# Patient Record
Sex: FEMALE | Race: WHITE | NOT HISPANIC OR LATINO | Employment: OTHER | URBAN - METROPOLITAN AREA
[De-identification: names, ages, dates, MRNs, and addresses within clinical notes are randomized per-mention and may not be internally consistent; named-entity substitution may affect disease eponyms.]

---

## 2021-10-14 ENCOUNTER — TELEPHONE (OUTPATIENT)
Dept: RHEUMATOLOGY | Facility: CLINIC | Age: 76
End: 2021-10-14

## 2021-10-15 ENCOUNTER — TELEPHONE (OUTPATIENT)
Dept: RHEUMATOLOGY | Facility: CLINIC | Age: 76
End: 2021-10-15

## 2021-10-24 PROBLEM — R53.1 WEAKNESS: Status: RESOLVED | Noted: 2021-10-24 | Resolved: 2021-10-24

## 2021-10-24 PROBLEM — C67.9: Status: ACTIVE | Noted: 2021-10-24

## 2021-10-24 PROBLEM — Z71.89 ACP (ADVANCE CARE PLANNING): Status: ACTIVE | Noted: 2021-10-24

## 2021-10-24 PROBLEM — E03.9 HYPOTHYROIDISM: Status: ACTIVE | Noted: 2021-10-24

## 2021-10-24 PROBLEM — I24.89 DEMAND ISCHEMIA: Status: ACTIVE | Noted: 2021-10-24

## 2021-10-24 PROBLEM — R53.1 WEAKNESS: Status: ACTIVE | Noted: 2021-10-24

## 2021-10-24 PROBLEM — N13.30: Status: ACTIVE | Noted: 2021-10-24

## 2021-10-24 PROBLEM — R73.9 HYPERGLYCEMIA: Status: ACTIVE | Noted: 2021-10-24

## 2021-10-24 PROBLEM — N94.89 ADNEXAL MASS: Status: ACTIVE | Noted: 2021-10-24

## 2021-10-25 PROBLEM — N13.39 OTHER HYDRONEPHROSIS: Status: ACTIVE | Noted: 2021-10-25

## 2022-03-10 PROBLEM — N13.30: Status: RESOLVED | Noted: 2021-10-24 | Resolved: 2022-03-10

## 2022-03-10 PROBLEM — C67.9: Status: RESOLVED | Noted: 2021-10-24 | Resolved: 2022-03-10

## 2022-06-27 ENCOUNTER — TELEPHONE (OUTPATIENT)
Dept: RHEUMATOLOGY | Facility: CLINIC | Age: 77
End: 2022-06-27
Payer: MEDICARE

## 2023-03-24 PROBLEM — I63.9 ACUTE CVA (CEREBROVASCULAR ACCIDENT): Status: ACTIVE | Noted: 2023-03-24

## 2023-03-24 PROBLEM — D72.829 LEUKOCYTOSIS: Status: ACTIVE | Noted: 2023-03-24

## 2023-03-24 PROBLEM — C56.9 OVARIAN CANCER: Status: ACTIVE | Noted: 2023-03-24

## 2023-03-25 PROBLEM — E78.49 OTHER HYPERLIPIDEMIA: Status: ACTIVE | Noted: 2023-03-25

## 2023-03-25 PROBLEM — I63.89 ACUTE ARTERIAL ISCHEMIC STROKE, MULTIFOCAL, MULTIPLE VASCULAR TERRITORIES: Status: ACTIVE | Noted: 2023-03-24

## 2023-03-25 PROBLEM — C54.1 ENDOMETRIAL ADENOCARCINOMA: Status: ACTIVE | Noted: 2023-03-25

## 2023-03-25 PROBLEM — N30.90 CYSTITIS: Status: ACTIVE | Noted: 2023-03-25

## 2023-03-25 PROBLEM — E87.1 HYPONATREMIA: Status: ACTIVE | Noted: 2023-03-25

## 2023-03-28 PROBLEM — R00.0 SINUS TACHYCARDIA: Status: ACTIVE | Noted: 2023-03-28

## 2023-04-05 ENCOUNTER — TELEPHONE (OUTPATIENT)
Dept: NEUROSURGERY | Facility: CLINIC | Age: 78
End: 2023-04-05
Payer: MEDICARE

## 2023-04-05 NOTE — TELEPHONE ENCOUNTER
Called patient to schedule a NP appointment with Dr. Orlando per referral from  Carmen Tariq MD. No answer.  LVM.  Patient has current imaging.

## 2023-04-12 ENCOUNTER — TELEPHONE (OUTPATIENT)
Dept: NEUROSURGERY | Facility: CLINIC | Age: 78
End: 2023-04-12
Payer: MEDICARE

## 2023-04-12 NOTE — TELEPHONE ENCOUNTER
Called patient to schedule a NP appt with Dr. Orlando per referral from Carmen Tariq MD.  No answer.  LVM.  Patient DOES have current imaging in the chart.

## 2023-04-19 ENCOUNTER — TELEPHONE (OUTPATIENT)
Dept: NEUROSURGERY | Facility: CLINIC | Age: 78
End: 2023-04-19
Payer: MEDICARE

## 2023-06-26 PROBLEM — I63.89 ACUTE ARTERIAL ISCHEMIC STROKE, MULTIFOCAL, MULTIPLE VASCULAR TERRITORIES: Status: RESOLVED | Noted: 2023-03-24 | Resolved: 2023-06-26
